# Patient Record
Sex: FEMALE | Race: WHITE | URBAN - METROPOLITAN AREA
[De-identification: names, ages, dates, MRNs, and addresses within clinical notes are randomized per-mention and may not be internally consistent; named-entity substitution may affect disease eponyms.]

---

## 2018-01-24 DIAGNOSIS — R30.0 DYSURIA: Primary | ICD-10-CM

## 2018-01-24 RX ORDER — SULFAMETHOXAZOLE/TRIMETHOPRIM 800-160 MG
1 TABLET ORAL 2 TIMES DAILY
Qty: 6 TABLET | Refills: 0 | Status: SHIPPED | OUTPATIENT
Start: 2018-01-24 | End: 2018-01-27

## 2018-01-26 DIAGNOSIS — Z30.41 SURVEILLANCE OF PREVIOUSLY PRESCRIBED CONTRACEPTIVE PILL: Primary | ICD-10-CM

## 2018-01-26 RX ORDER — NORETHINDRONE ACETATE AND ETHINYL ESTRADIOL .02; 1 MG/1; MG/1
1 TABLET ORAL DAILY
Qty: 84 TABLET | Refills: 3 | Status: SHIPPED | OUTPATIENT
Start: 2018-01-26 | End: 2018-10-12

## 2018-01-30 ENCOUNTER — ALLIED HEALTH/NURSE VISIT (OUTPATIENT)
Dept: OBGYN | Facility: CLINIC | Age: 38
End: 2018-01-30
Attending: OBSTETRICS & GYNECOLOGY
Payer: COMMERCIAL

## 2018-01-30 DIAGNOSIS — R30.0 DYSURIA: Primary | ICD-10-CM

## 2018-01-30 PROCEDURE — 87086 URINE CULTURE/COLONY COUNT: CPT | Performed by: OBSTETRICS & GYNECOLOGY

## 2018-01-31 LAB
BACTERIA SPEC CULT: NO GROWTH
Lab: NORMAL
SPECIMEN SOURCE: NORMAL

## 2018-09-20 ENCOUNTER — OFFICE VISIT (OUTPATIENT)
Dept: ORTHOPEDICS | Facility: CLINIC | Age: 38
End: 2018-09-20
Payer: COMMERCIAL

## 2018-09-20 ENCOUNTER — PRE VISIT (OUTPATIENT)
Dept: ORTHOPEDICS | Facility: CLINIC | Age: 38
End: 2018-09-20

## 2018-09-20 ENCOUNTER — RADIANT APPOINTMENT (OUTPATIENT)
Dept: GENERAL RADIOLOGY | Facility: CLINIC | Age: 38
End: 2018-09-20
Attending: FAMILY MEDICINE
Payer: COMMERCIAL

## 2018-09-20 VITALS — RESPIRATION RATE: 16 BRPM | BODY MASS INDEX: 20.59 KG/M2 | HEIGHT: 69 IN | WEIGHT: 139 LBS

## 2018-09-20 DIAGNOSIS — M25.512 ACUTE PAIN OF LEFT SHOULDER: ICD-10-CM

## 2018-09-20 DIAGNOSIS — R20.0 NUMBNESS OF LEFT HAND: ICD-10-CM

## 2018-09-20 DIAGNOSIS — R20.0 NUMBNESS OF LEFT HAND: Primary | ICD-10-CM

## 2018-09-20 RX ORDER — LEVONORGESTREL/ETHIN.ESTRADIOL 0.1-0.02MG
1 TABLET ORAL DAILY
COMMUNITY

## 2018-09-20 NOTE — LETTER
"  9/20/2018      RE: Gladys Morse  [address] [facility] [address]        Subjective:   Gladys Morse is a 38 year old female who presents with left shoulder pain x2 months.  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  She denies any known injuries.  She has not experienced any weakness but does occasionally experience numbness and tingling in the first 3 digits of her left hand especially in the morning.  She does not experience this numbness or tingling during her day or with working with her hands such as during suturing.  She has no prior history of shoulder or neck injuries injuries with her symptoms developing insidiously.  She has utilized ibuprofen with remarkable improvement in her discomfort, albeit somewhat limited in the duration of her relief.  She denies any history of shoulder instability.  She is not currently experiencing neck pain.  She believes she may sleep on too large of a pillow at night.    Background:   Date of injury: NA   Duration of symptoms: 2 months  Mechanism of Injury: Insidious Onset; Unknown   Aggravating factors: leaning on elbow, sleeping on left side  Relieving Factors: ibuprofen , shoulder stretching   Prior Evaluation: None    PAST MEDICAL, SOCIAL, SURGICAL AND FAMILY HISTORY: Denies significant medical or surgical history other than noted above.   Her family history is not on file.  She reports that she has never smoked. She has never used smokeless tobacco.    ALLERGIES: She has No Known Allergies.    CURRENT MEDICATIONS: She has a current medication list which includes the following prescription(s): levonorgestrel-ethinyl estradiol and norethindrone-ethinyl estradiol.     REVIEW OF SYSTEMS: 12 point review of systems is negative except as noted above.     Exam:   Resp 16  Ht 5' 8.5\" (1.74 m)  Wt 139 lb (63 kg)  BMI 20.83 kg/m2           CONSTITUTIONAL: healthy, alert and no distress  HEAD: Normocephalic.  SKIN: no suspicious lesions or rashes  GAIT: " normal  NEUROLOGIC: Non-focal  PSYCHIATRIC: affect normal/bright and mentation appears normal.  MUSCULOSKELETAL:   L Shoulder: No effusion or deformity.  Range of motion on the left limited to 165  with active flexion and abduction compared to 180  contralaterally.  This range of motion is equal at 180  with passive movement.  Strength was difficult to assess on the left secondary to discomfort but appears to have intact strength with testing of the supraspinatus, infraspinatus, teres minor, and subscapularis.  Positive Neer's, Wood, and [place]'s.  No tenderness over the long head of the biceps or with speeds.  No apprehension.  Evidence of mild scapulothoracic dyskinesis.  Contralateral shoulder with 5/5 strength and full ROM w/o pain.  - Neck:  No obvious deformity, however there is noticeable loss of the normal lordotic cervical curve. Full forward flexion, extension, lateral flexion, and rotation. Spine non-tender to palpation over cervical and thoracic processes.  Mild TTP over L paraspinal and trapezius muscles.  5/5 strength in bilateral upper and lower extremities. Normal upper andsensation bilaterally. Negative Spurling. 2+ brachioradialis and biceps reflexes bilaterally.  -Left hand: No thenar or hyperthenar wasting.  Intact sensation.  Negative Tinel's and Phalen's     XR Cervical Spine 2-view 9/20/2018:  -Loss of normal lordotic curve without other acute process     Assessment/Plan:   #) Left shoulder pain  #) Possible radicular symptoms to left hand in C6-C7 distribution  #) Loss of cervical lordosis    Imaging and examination discussed with the patient as above.  Discussed that she appears to have some shoulder impingement signs in setting of scapular dyskinesis; however, this is also in the setting of loss of cervical lordosis with possible intermittent left-sided radicular symptoms (when she first awakes) that are not present or reproducable today.  She is not having any weakness.  Therefore,  following joint decision making, recommend proceeding with physical therapy focusing on shoulder girdle strengthening, improvement of scapulothoracic dysfunction, as well as improving normal lordotic curve of her cervical vertebrae.  Recommend continued use of NSAIDs as needed for discomfort.  Discussed we may consider long-acting NSAIDs versus short course of corticosteroids in the future if needed, but she agrees with deferring this at this time.  Should she have recurrence of radicular symptoms that occur in the day or no improvement in symptoms, consider MRI to further evaluate.    Patient endorsed understanding and agreement with the above plan    Alex Diaz MD  Primary Care Sports Medicine, [facility]      Alex Diaz MD

## 2018-09-20 NOTE — LETTER
Date:September 21, 2018      Patient was self referred, no letter generated. Do not send.        [facility] Information

## 2018-09-20 NOTE — PROGRESS NOTES
" Subjective:   Gladys Morse is a 38 year old female who presents with left shoulder pain x2 months.  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  She denies any known injuries.  She has not experienced any weakness but does occasionally experience numbness and tingling in the first 3 digits of her left hand especially in the morning.  She does not experience this numbness or tingling during her day or with working with her hands such as during suturing.  She has no prior history of shoulder or neck injuries injuries with her symptoms developing insidiously.  She has utilized ibuprofen with remarkable improvement in her discomfort, albeit somewhat limited in the duration of her relief.  She denies any history of shoulder instability.  She is not currently experiencing neck pain.  She believes she may sleep on too large of a pillow at night.    Background:   Date of injury: NA   Duration of symptoms: 2 months  Mechanism of Injury: Insidious Onset; Unknown   Aggravating factors: leaning on elbow, sleeping on left side  Relieving Factors: ibuprofen , shoulder stretching   Prior Evaluation: None    PAST MEDICAL, SOCIAL, SURGICAL AND FAMILY HISTORY: Denies significant medical or surgical history other than noted above.   Her family history is not on file.  She reports that she has never smoked. She has never used smokeless tobacco.    ALLERGIES: She has No Known Allergies.    CURRENT MEDICATIONS: She has a current medication list which includes the following prescription(s): levonorgestrel-ethinyl estradiol and norethindrone-ethinyl estradiol.     REVIEW OF SYSTEMS: 12 point review of systems is negative except as noted above.     Exam:   Resp 16  Ht 5' 8.5\" (1.74 m)  Wt 139 lb (63 kg)  BMI 20.83 kg/m2           CONSTITUTIONAL: healthy, alert and no distress  HEAD: Normocephalic.  SKIN: no suspicious lesions or rashes  GAIT: normal  NEUROLOGIC: Non-focal  PSYCHIATRIC: affect normal/bright and mentation appears " normal.  MUSCULOSKELETAL:   L Shoulder: No effusion or deformity.  Range of motion on the left limited to 165  with active flexion and abduction compared to 180  contralaterally.  This range of motion is equal at 180  with passive movement.  Strength was difficult to assess on the left secondary to discomfort but appears to have intact strength with testing of the supraspinatus, infraspinatus, teres minor, and subscapularis.  Positive Neer's, Wood, and [place]'s.  No tenderness over the long head of the biceps or with speeds.  No apprehension.  Evidence of mild scapulothoracic dyskinesis.  Contralateral shoulder with 5/5 strength and full ROM w/o pain.  - Neck:  No obvious deformity, however there is noticeable loss of the normal lordotic cervical curve. Full forward flexion, extension, lateral flexion, and rotation. Spine non-tender to palpation over cervical and thoracic processes.  Mild TTP over L paraspinal and trapezius muscles.  5/5 strength in bilateral upper and lower extremities. Normal upper andsensation bilaterally. Negative Spurling. 2+ brachioradialis and biceps reflexes bilaterally.  -Left hand: No thenar or hyperthenar wasting.  Intact sensation.  Negative Tinel's and Phalen's     XR Cervical Spine 2-view 9/20/2018:  -Loss of normal lordotic curve without other acute process     Assessment/Plan:   #) Left shoulder pain  #) Possible radicular symptoms to left hand in C6-C7 distribution  #) Loss of cervical lordosis    Imaging and examination discussed with the patient as above.  Discussed that she appears to have some shoulder impingement signs in setting of scapular dyskinesis; however, this is also in the setting of loss of cervical lordosis with possible intermittent left-sided radicular symptoms (when she first awakes) that are not present or reproducable today.  She is not having any weakness.  Therefore, following joint decision making, recommend proceeding with physical therapy focusing on  shoulder girdle strengthening, improvement of scapulothoracic dysfunction, as well as improving normal lordotic curve of her cervical vertebrae.  Recommend continued use of NSAIDs as needed for discomfort.  Discussed we may consider long-acting NSAIDs versus short course of corticosteroids in the future if needed, but she agrees with deferring this at this time.  Should she have recurrence of radicular symptoms that occur in the day or no improvement in symptoms, consider MRI to further evaluate.    Patient endorsed understanding and agreement with the above plan    Alex Diaz MD  Primary Care Sports Medicine, [facility]

## 2018-09-20 NOTE — MR AVS SNAPSHOT
After Visit Summary   9/20/2018    Gladys Morse    MRN: [id number]           Patient Information     Date Of Birth          1980        Visit Information        Provider Department      9/20/2018 1:00 PM Alex Diaz MD [facility]        Today's Diagnoses     Numbness of left hand    -  1    Acute pain of left shoulder           Follow-ups after your visit        Additional Services     PHYSICAL THERAPY REFERRAL (Internal)       Physical Therapy Referral    Please focus on shoulder and scapular thoracic strengthening in addition to neck strengthening exercises.                  Follow-up notes from your care team     Return if symptoms worsen or fail to improve after 4 weeks of PT.      Your next 10 appointments already scheduled     Sep 26, 2018  5:10 PM CDT   (Arrive by 4:55 PM)   SUNITHA Extremity [facility]   [facility]    [address] [place] [id number] [phone]            Oct 04, 2018  8:50 AM CDT   SUNITHA Extremity with Alex Gutierres, PT   [facility]    [address] [place] [id number] [phone]              Future tests that were ordered for you today     Open Future Orders        Priority Expected Expires Ordered    PHYSICAL THERAPY REFERRAL (Internal) Routine  9/20/2019 9/20/2018            Who to contact     Please call your clinic at [phone] to:    Ask questions about your health    Make or cancel appointments    Discuss your medicines    Learn about your test results    Speak to your doctor            Additional Information About Your Visit        MyChart Information     [employer] is an electronic gateway that provides easy, online access to your medical records. With [employer], you can request a clinic appointment, read your test results, renew a prescription or communicate with your  "care team.     To sign up for MyChart visit the website at [email or web address]   You will be asked to enter the access code listed below, as well as some personal information. Please follow the directions to create your username and password.     Your access code is: -[email or web address]  Expires: 2018  6:30 AM     Your access code will  in 90 days. If you need help or a new code, please contact your [facility] or call [phone] for assistance.        Care EveryWhere ID     This is your Care EveryWhere ID. This could be used by other organizations to access your [place] medical records  [id number]        Your Vitals Were     Respirations Height BMI (Body Mass Index)             16 5' 8.5\" (1.74 m) 20.83 kg/m2          Blood Pressure from Last 3 Encounters:   No data found for BP    Weight from Last 3 Encounters:   18 139 lb (63 kg)               Primary Care Provider    Provider Not In System                Equal Access to Services     OSMANI SANCHEZ : Hadii aad ku hadasho Soomaali, waaxda luqadaha, qaybta kaalmada adeegyada, waxay franchescain hayrin salazar . So [facility] [phone].    ATENCIÓN: Si habla español, tiene a moran disposición servicios gratuitos de asistencia lingüística. Llame al [phone].    We comply with applicable federal civil rights laws and [place] laws. We do not discriminate on the basis of race, color, national origin, age, disability, sex, sexual orientation, or gender identity.            Thank you!     Thank you for choosing [facility]  for your care. Our goal is always to provide you with excellent care. Hearing back from our patients is one way we can continue to improve our services. Please take a few minutes to complete the written survey that you may receive in the mail after your visit with us. Thank you!             Your Updated Medication List - Protect others around you: Learn how to safely use, store and " throw away your medicines at www.disposemymeds.org.          This list is accurate as of 9/20/18  3:46 PM.  Always use your most recent med list.                   Brand Name Dispense Instructions for use Diagnosis    levonorgestrel-ethinyl estradiol 0.1-20 MG-MCG per tablet    THAI NUNES LESSINA     Take 1 tablet by mouth daily        norethindrone-ethinyl estradiol 1-20 MG-MCG per tablet    MICROGESTIN 1/20    84 tablet    Take 1 tablet by mouth daily    Surveillance of previously prescribed contraceptive pill

## 2018-09-26 ENCOUNTER — THERAPY VISIT (OUTPATIENT)
Dept: PHYSICAL THERAPY | Facility: CLINIC | Age: 38
End: 2018-09-26
Payer: COMMERCIAL

## 2018-09-26 DIAGNOSIS — M54.2 NECK PAIN: Primary | ICD-10-CM

## 2018-09-26 DIAGNOSIS — M25.512 ACUTE PAIN OF LEFT SHOULDER: ICD-10-CM

## 2018-09-26 PROCEDURE — 97110 THERAPEUTIC EXERCISES: CPT | Mod: GP | Performed by: PHYSICAL THERAPIST

## 2018-09-26 PROCEDURE — 97161 PT EVAL LOW COMPLEX 20 MIN: CPT | Mod: GP | Performed by: PHYSICAL THERAPIST

## 2018-09-26 PROCEDURE — 97530 THERAPEUTIC ACTIVITIES: CPT | Mod: GP | Performed by: PHYSICAL THERAPIST

## 2018-09-26 NOTE — PROGRESS NOTES
Physical Therapy Initial Examination/Evaluation  September 26, 2018    Gladys Morse is a 38 year old female referred to physical therapy by Ari Diaz MD for treatment of L shoulder pain, cervical radiculopathy with Precautions/Restrictions/MD instructions E&T    Therapist Impression:   Nyla presents with L upper quadrant pain, L shoulder/RC pathology, and possible neural tension.  We initiated posture education and cervical retraction repeated motions.  We will consider TPR/STM in the future and assess 1st rib mobility in the future.    Subjective:  DOI/onset: 2 months ago, 7/26/2018 DOS: none  Acute Injury or Gradual Onset?: Gradual injury over time  Mechanism of Injury: unknown  Related PMH: none Previous Treatment: Tylenol and Ibuprofen Effect of prior treatment: good  Imaging: x-ray  Chief Complaint/Functional Limitations:   Sleep is affected, reaching out to side, lifting arm OH and see below in therapy evaluation codes   Pain: rest 0 /10, activity 8/10 Location: lateral arm and digits 1-3 Frequency: Intermittent Described as: aching, tingling and numb Alleviated by: Tylenol and Ibuprofen Progression of Symptoms: Gradually getting worse. Time of day when pain is worse: Morning and Night  Sleeping: Interrupted due to current issue   Occupation: MD  Job duties: keyboarding/computer use, prolonged standing, pushing/pulling  Current HEP/exercise regimen: NA  Patient's goals are see chief complaints     Other pertinent PMH/Red Flags: Depression, History of Fractures, Numbness/Tingling, Pain at Night/Rest   Barriers at home/work: None as reported by patient  Pertinent Surgical History: No significant muscioloskeletal surgeries  Medications: Anti-inflammatory  General health as reported by patient: good  Return to MD:  prn    Cervical Spine Evaluation    Posture  Forward Head: mild  Rounded Shoulders: mild  Thoracic Spine: na    Range of Motion (measured in % restriction)  Flexion: wnl  Extension: wnl  Sidebend  Left: 10  Right: wnl  Rotation Left: wnl  Right: wnl  Protraction: na  Retraction: na  Joint play: na  Upper extremity screen: wnl    Upper Extremity Strength  Shoulder MMT Flexion Scaption ER IR   Left 4+/5 4+/5 4/5 pain 4+/5   Right 4+/5 4+/5 4+/5 4+/5     Special Tests  Spurling's: Negative  Reflexes: NA  Neural tension: Positive (noted during SB horizontal abduction  Cervical ligaments (Alar and Transverse) screen: NA    Assessment/Plan:  Patient is a 38 year old female with cervical and left side shoulder complaints.    Patient has the following significant findings with corresponding treatment plan.                Diagnosis 1:  L sided neck and shoulder pain  Pain -  hot/cold therapy, manual therapy, splint/taping/bracing/orthotics, self management, education and home program  Decreased ROM/flexibility - manual therapy and therapeutic exercise  Decreased strength - therapeutic exercise and therapeutic activities  Impaired muscle performance - neuro re-education  Impaired posture - neuro re-education    Therapy Evaluation Codes:   1) History comprised of:   Personal factors that impact the plan of care:      None.    Comorbidity factors that impact the plan of care are:      None.     Medications impacting care: None.  2) Examination of Body Systems comprised of:   Body structures and functions that impact the plan of care:      Cervical spine and Shoulder.   Activity limitations that impact the plan of care are:      Dressing and Lifting.  3) Clinical presentation characteristics are:   Stable/Uncomplicated.  4) Decision-Making    Low complexity using standardized patient assessment instrument and/or measureable assessment of functional outcome.  Cumulative Therapy Evaluation is: Low complexity.    Previous and current functional limitations:  (See Goal Flow Sheet for this information)    Short term and Long term goals: (See Goal Flow Sheet for this information)     Communication ability:  Patient appears to  be able to clearly communicate and understand verbal and written communication and follow directions correctly.  Treatment Explanation - The following has been discussed with the patient:   RX ordered/plan of care  Anticipated outcomes  Possible risks and side effects  This patient would benefit from PT intervention to resume normal activities.   Rehab potential is good.    Frequency:  2 X a month, once daily  Duration:  for 3 months  Discharge Plan:  Achieve all LTG.  Independent in home treatment program.  Reach maximal therapeutic benefit.    Please refer to the daily flowsheet for treatment today, total treatment time and time spent performing 1:1 timed codes.     .

## 2018-09-26 NOTE — MR AVS SNAPSHOT
"              After Visit Summary   9/26/2018    Gladys Morse    MRN: [id number]           Patient Information     Date Of Birth          1980        Visit Information        Provider Department      9/26/2018 5:10 PM Floyd Arnett PT [facility] [employer] [place]        Today's Diagnoses     Neck pain    -  1    Acute pain of left shoulder           Follow-ups after your visit        Your next 10 appointments already scheduled     Oct 11, 2018  7:40 AM CDT   SUNITHA Extremity with Irina Rios PT   [facility] [employer] [place] ([address] [id number] [phone]              Who to contact     If you have questions or need follow up information about today's clinic visit or your schedule please contact [facility] [employer] [facility] directly at [phone].  Normal or non-critical lab and imaging results will be communicated to you by [employer], letter or phone within 4 business days after the clinic has received the results. If you do not hear from us within 7 days, please contact the clinic through [employer] or phone. If you have a critical or abnormal lab result, we will notify you by phone as soon as possible.  Submit refill requests through [facility] or call your pharmacy and they will forward the refill request to us. Please allow 3 business days for your refill to be completed.          Additional Information About Your Visit        [employer] Information     [facility] lets you send messages to your doctor, view your test results, renew your prescriptions, schedule appointments and more. To sign up, go to [email or web address] . Click on \"Log in\" on the left side of the screen, which will take you to the Welcome page. Then click on \"Sign up Now\" on the right side of the page.     You will be asked to enter the access code listed below, as well as some personal information. Please follow the directions to create " your username and password.     Your access code is: -[email or web address]  Expires: 2018  6:30 AM     Your access code will  in 90 days. If you need help or a new code, please call your [place] clinic or [phone].        Care EveryWhere ID     This is your Care EveryWhere ID. This could be used by other organizations to access your [place] medical records  [id number]         Blood Pressure from Last 3 Encounters:   No data found for BP    Weight from Last 3 Encounters:   18 63 kg (139 lb)              We Performed the Following     HC PT EVAL, LOW COMPLEXITY     PHYSICAL THERAPY REFERRAL (Internal)     THERAPEUTIC ACTIVITIES     THERAPEUTIC EXERCISES        Primary Care Provider    None Specified       No primary provider on file.        Equal Access to Services     [facility] Hospital[facility]HY : Ivis Gutierrez, wamary gonzales, qamayeta kaalmada magda, jose g salazar . So [facility] [place] [facility] [phone].    ATENCIÓN: Si habla español, tiene a moran disposición servicios gratuitos de asistencia lingüística. Llame al [phone].    We comply with applicable federal civil rights laws and [place] laws. We do not discriminate on the basis of race, color, national origin, age, disability, sex, sexual orientation, or gender identity.            Thank you!     Thank you for choosing [facility] [place]  for your care. Our goal is always to provide you with excellent care. Hearing back from our patients is one way we can continue to improve our services. Please take a few minutes to complete the written survey that you may receive in the mail after your visit with us. Thank you!             Your Updated Medication List - Protect others around you: Learn how to safely use, store and throw away your medicines at www.disposemymeds.org.          This list is accurate as of 18 11:59 PM.  Always use your most recent med list.                   Brand Name Dispense  Instructions for use Diagnosis    levonorgestrel-ethinyl estradiol 0.1-20 MG-MCG per tablet    THAI NUNES LESSINA     Take 1 tablet by mouth daily        norethindrone-ethinyl estradiol 1-20 MG-MCG per tablet    MICROGESTIN 1/20    84 tablet    Take 1 tablet by mouth daily    Surveillance of previously prescribed contraceptive pill

## 2018-10-03 PROBLEM — M54.2 NECK PAIN: Status: ACTIVE | Noted: 2018-10-03

## 2018-10-03 PROBLEM — M25.512 ACUTE PAIN OF LEFT SHOULDER: Status: ACTIVE | Noted: 2018-10-03

## 2018-10-11 ENCOUNTER — THERAPY VISIT (OUTPATIENT)
Dept: PHYSICAL THERAPY | Facility: CLINIC | Age: 38
End: 2018-10-11
Payer: COMMERCIAL

## 2018-10-11 DIAGNOSIS — M54.2 NECK PAIN: ICD-10-CM

## 2018-10-11 DIAGNOSIS — M25.512 ACUTE PAIN OF LEFT SHOULDER: ICD-10-CM

## 2018-10-11 PROCEDURE — 97110 THERAPEUTIC EXERCISES: CPT | Mod: GP | Performed by: PHYSICAL THERAPIST

## 2018-10-11 PROCEDURE — 97140 MANUAL THERAPY 1/> REGIONS: CPT | Mod: GP | Performed by: PHYSICAL THERAPIST

## 2018-10-11 NOTE — MR AVS SNAPSHOT
"              After Visit Summary   10/11/2018    Gladys Morse    MRN: [id number]           Patient Information     Date Of Birth          1980        Visit Information        Provider Department      10/11/2018 7:40 AM Irina Rios PT [facility] [place]        Today's Diagnoses     Acute pain of left shoulder        Neck pain           Follow-ups after your visit        Your next 10 appointments already scheduled     Oct 25, 2018  7:40 AM CDT   SUNITHA Extremity with Irina Rios PT   [facility] [place] ([address] [id number] [phone]              Who to contact     If you have questions or need follow up information about today's clinic visit or your schedule please contact [facility] directly at [phone].  Normal or non-critical lab and imaging results will be communicated to you by [employer], letter or phone within 4 business days after the clinic has received the results. If you do not hear from us within 7 days, please contact the clinic through [employer] or phone. If you have a critical or abnormal lab result, we will notify you by phone as soon as possible.  Submit refill requests through [employer] or call your pharmacy and they will forward the refill request to us. Please allow 3 business days for your refill to be completed.          Additional Information About Your Visit        MyChart Information     [employer] lets you send messages to your doctor, view your test results, renew your prescriptions, schedule appointments and more. To sign up, go to [email or web address] . Click on \"Log in\" on the left side of the screen, which will take you to the Welcome page. Then click on \"Sign up Now\" on the right side of the page.     You will be asked to enter the access code listed below, as well as some personal information. Please follow the directions to create " your username and password.     Your access code is: [email or web address]  Expires: 2018  6:30 AM     Your access code will  in 90 days. If you need help or a new code, please call your [facility] clinic or [phone].        Care EveryWhere ID     This is your Care EveryWhere ID. This could be used by other organizations to access your [facility] medical records  [id number]         Blood Pressure from Last 3 Encounters:   No data found for BP    Weight from Last 3 Encounters:   18 63 kg (139 lb)              We Performed the Following     MANUAL THER TECH,1+REGIONS,EA 15 MIN     THERAPEUTIC EXERCISES        Primary Care Provider    None Specified       No primary provider on file.        Equal Access to Services     [facility]: Ivis Gutierrez, natacha gonzales, macarena kaalmameri man, jose g salazar . So [facility] [phone].    ATENCIÓN: Si habla español, tiene a moran disposición servicios gratuitos de asistencia lingüística. Llame al [phone].    We comply with applicable federal civil rights laws and [place] laws. We do not discriminate on the basis of race, color, national origin, age, disability, sex, sexual orientation, or gender identity.            Thank you!     Thank you for choosing [facility] [place]  for your care. Our goal is always to provide you with excellent care. Hearing back from our patients is one way we can continue to improve our services. Please take a few minutes to complete the written survey that you may receive in the mail after your visit with us. Thank you!             Your Updated Medication List - Protect others around you: Learn how to safely use, store and throw away your medicines at www.disposemymeds.org.          This list is accurate as of 10/11/18  8:37 AM.  Always use your most recent med list.                   Brand Name Dispense Instructions for use Diagnosis    levonorgestrel-ethinyl  estradiol 0.1-20 MG-MCG per tablet    THAI NUNES LESSINA     Take 1 tablet by mouth daily        norethindrone-ethinyl estradiol 1-20 MG-MCG per tablet    MICROGESTIN 1/20    84 tablet    Take 1 tablet by mouth daily    Surveillance of previously prescribed contraceptive pill

## 2018-10-12 DIAGNOSIS — Z30.41 SURVEILLANCE OF PREVIOUSLY PRESCRIBED CONTRACEPTIVE PILL: ICD-10-CM

## 2018-10-12 RX ORDER — NORETHINDRONE ACETATE AND ETHINYL ESTRADIOL .02; 1 MG/1; MG/1
1 TABLET ORAL DAILY
Qty: 84 TABLET | Refills: 3 | Status: SHIPPED | OUTPATIENT
Start: 2018-10-12

## 2019-10-19 PROBLEM — M54.2 NECK PAIN: Status: RESOLVED | Noted: 2018-10-03 | Resolved: 2019-10-19

## 2019-10-19 PROBLEM — M25.512 ACUTE PAIN OF LEFT SHOULDER: Status: RESOLVED | Noted: 2018-10-03 | Resolved: 2019-10-19

## 2019-10-19 NOTE — PROGRESS NOTES
Discharge Note    Progress reporting period is from Sept 26, 2018 to Oct 11, 2018.     Gladys failed to return for next follow up visit and current status is unknown.  Please see information below for last relevant information on current status.  Patient seen for Rxs Used: 2 visits.  SUBJECTIVE  Subjective changes noted by patient:  Subjective: has not been as compliant as she wants, but definitely has noticed improvement. has not had to take any ibuprofen in a week which is great. does still have a daily discomfort but not as intense. has not felt numbness or tingling in about a week. mostly just pain lateral shoulder. tried rolled up towel as pillow and loves this. reaching OH not as limited. reaching out to the side seems most bothersome. feels the shoulder ER isometrics feel the best.   .  Current pain level is Current Pain level: 0/10.     Previous pain level was  Initial Pain level: 8/10.   Changes in function:  Yes (See Goal flowsheet attached for changes in current functional level)  Adverse reaction to treatment or activity: None    OBJECTIVE  Changes noted in objective findings: Objective: CROM: flexion WNL painfree, ext WNL painfree, R SB WNL painfree, L SB 90% and produce L scap pain, R rot WNL painfree, L rot 90% with slight left scap pain. shoulder flexion WNL with ERP, ABD WNL with pain at 130. cervical retract + OP: NE during or after. cerv retract + ext: NE during, NE after. rep shoulder ext with wand: NE during, abolished neck pain with L SB and L rotation and abolished pain with shoulder flexion and ABD after. after performing SL shoulder ER: reproduced L shoulder pain. after shoulder ext at counter: pain again abolished. hypertonicity and tenderness of lev scap and UT. after release, full resolution of sx with cervical ROM     ASSESSMENT/PLAN  Diagnosis: L sided neck and shoulder pain   DIAGNOSIS:  L sided neck and shoulder pain  Updated problem list and treatment plan:   Pain - HEP  Decreased  ROM/flexibility - HEP  Decreased function - HEP  Decreased strength - HEP  STG/LTGs have been met or progress has been made towards goals:  Yes, please see goal flowsheet for most current information  Assessment of Progress: current status is unknown.  Last current status: Assessment of progress: Pt is progressing as expected   Self Management Plans:  HEP  I have re-evaluated this patient and find that the nature, scope, duration and intensity of the therapy is appropriate for the medical condition of the patient.  Gladys continues to require the following intervention to meet STG and LTG's:  HEP.    Recommendations:  Discharge with current home program.  Patient to follow up with MD as needed.    Please refer to the daily flowsheet for treatment today, total treatment time and time spent performing 1:1 timed codes.